# Patient Record
Sex: MALE | Race: BLACK OR AFRICAN AMERICAN | Employment: FULL TIME | URBAN - METROPOLITAN AREA
[De-identification: names, ages, dates, MRNs, and addresses within clinical notes are randomized per-mention and may not be internally consistent; named-entity substitution may affect disease eponyms.]

---

## 2017-04-11 ENCOUNTER — HOSPITAL ENCOUNTER (EMERGENCY)
Facility: HOSPITAL | Age: 27
Discharge: HOME | End: 2017-04-11
Attending: EMERGENCY MEDICINE
Payer: COMMERCIAL

## 2017-04-11 VITALS
SYSTOLIC BLOOD PRESSURE: 148 MMHG | TEMPERATURE: 98 F | WEIGHT: 235 LBS | BODY MASS INDEX: 33 KG/M2 | RESPIRATION RATE: 16 BRPM | HEART RATE: 79 BPM | DIASTOLIC BLOOD PRESSURE: 90 MMHG | OXYGEN SATURATION: 98 %

## 2017-04-11 DIAGNOSIS — J02.9 ACUTE VIRAL PHARYNGITIS: Primary | ICD-10-CM

## 2017-04-11 PROCEDURE — 99283 EMERGENCY DEPT VISIT LOW MDM: CPT

## 2017-04-11 PROCEDURE — 87081 CULTURE SCREEN ONLY: CPT | Performed by: EMERGENCY MEDICINE

## 2017-04-11 PROCEDURE — 87430 STREP A AG IA: CPT | Performed by: EMERGENCY MEDICINE

## 2017-04-11 RX ORDER — IBUPROFEN 600 MG/1
600 TABLET ORAL ONCE
Status: COMPLETED | OUTPATIENT
Start: 2017-04-11 | End: 2017-04-11

## 2017-04-11 NOTE — ED PROVIDER NOTES
Patient Seen in: [facility] Emergency Department    History   Patient presents with:  Sore Throat    Stated Complaint: sore throat    HPI    This is a 51-year-old male who arrives here with complaints of sore throat that started yesterday.   He state Pulse 92  Temp(Src) 98.3 °F (36.8 °C) (Temporal)  Resp 16  Wt 106.595 kg  SpO2 99%        Physical Exam    General: The patient has some mild redness to both tonsils. There is no exudate, peritonsillar abscess.   He does have some cysts nonspecific lymph

## 2017-04-12 ENCOUNTER — TELEPHONE (OUTPATIENT)
Dept: FAMILY MEDICINE CLINIC | Facility: CLINIC | Age: 27
End: 2017-04-12

## 2017-04-17 ENCOUNTER — PATIENT MESSAGE (OUTPATIENT)
Dept: FAMILY MEDICINE CLINIC | Facility: CLINIC | Age: 27
End: 2017-04-17

## 2017-04-18 NOTE — TELEPHONE ENCOUNTER
From: Topher Wilson  To: Sharon Bergeron MD  Sent: 4/17/2017 4:12 PM CDT  Subject: Other    Hello Doctor Virginia,    I know that I visited the [facility] in [facility] to test myself for strep throat but I was wondering if you or the Doctor that examined me

## 2017-04-19 NOTE — TELEPHONE ENCOUNTER
Pt can ask ER for note or make appt to get seen in the clinic today or tomorrow. He hasn't been seen in the clinic since 2015.    thanks

## 2017-04-19 NOTE — TELEPHONE ENCOUNTER
Patient seen in ER on 4/11/17 for Acute Viral Pharyngitis. Would you be okay with providing note for patient to be off 4/11, 4/12, 4/13 and 4/15. Patient has not been seen in our office since 2015. Patient to be notified OV due. Thank you.

## 2017-06-10 ENCOUNTER — PATIENT MESSAGE (OUTPATIENT)
Dept: FAMILY MEDICINE CLINIC | Facility: CLINIC | Age: 27
End: 2017-06-10

## 2017-06-12 NOTE — TELEPHONE ENCOUNTER
From: Tequila Colindres  To: Juve Sorenson MD  Sent: 6/10/2017 4:12 PM CDT  Subject: Non-Urgent Medical Question    Good Morning Dr. Dino Patel.    I recently took a medical examination with the [facility] Department and the results came back as 6.8 negative fo

## 2018-02-20 ENCOUNTER — LAB ENCOUNTER (OUTPATIENT)
Dept: LAB | Age: 28
End: 2018-02-20
Attending: PHYSICIAN ASSISTANT
Payer: COMMERCIAL

## 2018-02-20 ENCOUNTER — OFFICE VISIT (OUTPATIENT)
Dept: FAMILY MEDICINE CLINIC | Facility: CLINIC | Age: 28
End: 2018-02-20

## 2018-02-20 VITALS
TEMPERATURE: 99 F | BODY MASS INDEX: 33.6 KG/M2 | WEIGHT: 240 LBS | HEIGHT: 71 IN | HEART RATE: 80 BPM | SYSTOLIC BLOOD PRESSURE: 122 MMHG | RESPIRATION RATE: 16 BRPM | DIASTOLIC BLOOD PRESSURE: 78 MMHG

## 2018-02-20 DIAGNOSIS — Z86.19 H/O COLD SORES: ICD-10-CM

## 2018-02-20 DIAGNOSIS — Z86.19 H/O COLD SORES: Primary | ICD-10-CM

## 2018-02-20 PROCEDURE — 86694 HERPES SIMPLEX NES ANTBDY: CPT | Performed by: PHYSICIAN ASSISTANT

## 2018-02-20 PROCEDURE — 36415 COLL VENOUS BLD VENIPUNCTURE: CPT | Performed by: PHYSICIAN ASSISTANT

## 2018-02-20 PROCEDURE — 99213 OFFICE O/P EST LOW 20 MIN: CPT | Performed by: PHYSICIAN ASSISTANT

## 2018-02-20 RX ORDER — VALACYCLOVIR HYDROCHLORIDE 1 G/1
2 TABLET, FILM COATED ORAL EVERY 12 HOURS SCHEDULED
Qty: 4 TABLET | Refills: 5 | Status: SHIPPED | OUTPATIENT
Start: 2018-02-20 | End: 2018-05-21

## 2018-02-20 NOTE — PROGRESS NOTES
[facility] Internal Medicine Progress Note    CC:  Patient presents with:  Complete Form: Pts mother is an in-home  and has form to be completed.   Mouth Cold Sores (respiratory/integumentary): pt states he had active coldsore and kissed gi Cuff Size: adult)   Pulse 80   Temp 99 °F (37.2 °C) (Oral)   Resp 16   Ht 71\"   Wt 240 lb   BMI 33.47 kg/m²  Body mass index is 33.47 kg/m².   Physical Exam   Constitutional: He is oriented to person, place, and time and well-developed, well-nourished, and education done. Outcome: Patient verbalizes understanding.     Problem List:  Patient Active Problem List:     Herpes simplex without mention of complication     Routine general medical examination at a health care facility     Goiter

## 2018-02-20 NOTE — PATIENT INSTRUCTIONS
Thank you for choosing Lindsey Khalil PA-C at [place] [address]  To Do: Raven Kelley  1. Pt to get lab work done  2. Pt to begin medication as directed as soon as symptoms start, for 1 day only  3.  Follow-up 1 year for annual well visit    • Please informing you that the insurance company approved your testing, please call Central Scheduling at [phone]  Please allow our office 5 business days to contact you regarding any testing results.     Refill policies:   Allow 3 business days for refills; c

## 2018-02-22 LAB
HSV TYPE 1/2 COMBINED AB, IGG: >22.4 IV
HSV TYPE 1/2 COMBINED ABS, IGM: 0.39 IV

## 2018-05-09 ENCOUNTER — OFFICE VISIT (OUTPATIENT)
Dept: FAMILY MEDICINE CLINIC | Facility: CLINIC | Age: 28
End: 2018-05-09

## 2018-05-09 VITALS
WEIGHT: 242 LBS | SYSTOLIC BLOOD PRESSURE: 120 MMHG | RESPIRATION RATE: 16 BRPM | BODY MASS INDEX: 33.88 KG/M2 | DIASTOLIC BLOOD PRESSURE: 80 MMHG | HEART RATE: 66 BPM | TEMPERATURE: 98 F | HEIGHT: 71 IN

## 2018-05-09 DIAGNOSIS — L03.012 PARONYCHIA OF FINGER, LEFT: Primary | ICD-10-CM

## 2018-05-09 PROCEDURE — 99213 OFFICE O/P EST LOW 20 MIN: CPT | Performed by: FAMILY MEDICINE

## 2018-05-09 RX ORDER — CEPHALEXIN 500 MG/1
500 CAPSULE ORAL 4 TIMES DAILY
Qty: 40 CAPSULE | Refills: 0 | Status: SHIPPED | OUTPATIENT
Start: 2018-05-09 | End: 2018-05-19

## 2018-05-09 NOTE — PROGRESS NOTES
HPI:    Patient ID: Carlito Day. is a 24year old male. HPI  Mr. Emiliano Sanchez is a pleasant 25 y/o M who has been generally healthy here today for pain and swelling of his left index finger which she noted 4 days ago.   He did not notice any redness but f cervical adenopathy. Neurological: He is alert. Skin:   Examination of the skin overlying the left second finger reveals swelling and warmth and erythema on the medial aspect. There was good range of motion of that hand and finger.   Normal strength

## 2018-05-09 NOTE — PATIENT INSTRUCTIONS
Thank you for choosing Keyla Chance MD at [place] [address]  To Do: Jordana Henry.  1. Please see info RE; [facility] below  [employer] is located in [address]. Monday, Tuesday & Friday – 8 a.m. to 4 p.m.   Wednesday, Thursday – 7 a.m. t • Please call our office about any questions regarding your treatment/medicines/tests as a result of today's visit.  For your safety, read the entire package insert of all medicines prescribed to you and be aware of all of the risks of treatment even beyon The infection will need to be drained if pus is present. If the infection has been caught early, you may need only antibiotic treatment. Healing will take about 1 to 2 weeks.   Home care  Follow these guidelines when caring for yourself at home:  · Clean an © 3405-4922 [employer] [address]. [address] [facility], [facility] [place]. All rights reserved. This information is not intended as a substitute for professional medical care. Always follow your healthcare professional's instructions.

## 2018-05-21 RX ORDER — VALACYCLOVIR HYDROCHLORIDE 1 G/1
2 TABLET, FILM COATED ORAL EVERY 12 HOURS SCHEDULED
Qty: 4 TABLET | Refills: 5 | Status: SHIPPED | OUTPATIENT
Start: 2018-05-21 | End: 2019-09-12

## 2018-05-21 NOTE — TELEPHONE ENCOUNTER
Requesting Valtrex  LOV: 5/9/18  RTC: prn  Last Relevant Labs: n/a  Filled: 2/20/18 #4 with 5 refills    No future appointments.       Herpes Agent Protocol Passed5/21 1:55 PM   Appointment in the past 12 or next 3 months     PP - refilled

## 2018-10-24 ENCOUNTER — PATIENT MESSAGE (OUTPATIENT)
Dept: FAMILY MEDICINE CLINIC | Facility: CLINIC | Age: 28
End: 2018-10-24

## 2018-10-24 NOTE — TELEPHONE ENCOUNTER
From: Wisam Marcum. To: Felipe Herrera  Sent: 10/24/2018 12:04 PM CDT  Subject: Non-Urgent Medical Question    Hi Ms Antonio Alexandre,    I was wondering if I could get a copy of my blood pressure history? I need to give it to my employer.  They ha

## 2018-10-24 NOTE — PROGRESS NOTES
Patient requesting print out of B/P readings. Okay to provide?   Can provide patient with Vital signs flowsheet

## 2019-01-09 ENCOUNTER — OFFICE VISIT (OUTPATIENT)
Dept: FAMILY MEDICINE CLINIC | Facility: CLINIC | Age: 29
End: 2019-01-09
Payer: COMMERCIAL

## 2019-01-09 VITALS
RESPIRATION RATE: 16 BRPM | HEART RATE: 74 BPM | HEIGHT: 71 IN | TEMPERATURE: 99 F | DIASTOLIC BLOOD PRESSURE: 68 MMHG | WEIGHT: 255 LBS | SYSTOLIC BLOOD PRESSURE: 112 MMHG | BODY MASS INDEX: 35.7 KG/M2

## 2019-01-09 DIAGNOSIS — S91.209D AVULSION OF TOENAIL, SUBSEQUENT ENCOUNTER: Primary | ICD-10-CM

## 2019-01-09 PROCEDURE — 99213 OFFICE O/P EST LOW 20 MIN: CPT | Performed by: FAMILY MEDICINE

## 2019-01-09 NOTE — PROGRESS NOTES
HPI:    Patient ID: Julián Knowles. is a 34year old male. HPI  Mr. Job Mena is a pleasant 53-year-old gentleman who was recently seen at [facility] emergency room after his right foot was caught in the door.   He had noted his right great toena

## 2019-01-09 NOTE — PATIENT INSTRUCTIONS
Thank you for choosing Julieth Joyce MD at [place] [address]  To Do: Rubina Greer.  1.  Please wash wound with soap and water and watch for signs of bacterial infection such as redness, pain, swelling, discharge.   [facility] is located i and stop treatment if problems arise, but know that our intention is that the benefits outweigh those potential risks and we strive to make you healthier and to improve your quality of life.     Referrals, and Radiology Information:    If your insurance req

## 2019-02-07 ENCOUNTER — PATIENT MESSAGE (OUTPATIENT)
Dept: FAMILY MEDICINE CLINIC | Facility: CLINIC | Age: 29
End: 2019-02-07

## 2019-02-07 DIAGNOSIS — N46.9 INFERTILITY MALE: Primary | ICD-10-CM

## 2019-02-07 DIAGNOSIS — Z31.41 FERTILITY TESTING: ICD-10-CM

## 2019-02-07 NOTE — TELEPHONE ENCOUNTER
From: Mino Terry. To: Dennie Pore, MD  Sent: 2/7/2019 1:55 AM CST  Subject: Non-Urgent Jewelene Peeling Dr Jaime Palomo,    I wanted to inquire about finding out about potential testing for my fertility rates?  I am not trying to have childr

## 2019-02-07 NOTE — PROGRESS NOTES
Would you like to refer pt to Reproductive Endocrinology & Infertility    Nestor Cabezas MD  [place] [id number] [facility]  [address] [place] [address], [facility] [address]  Phone: [phone]  Fax: [phone]

## 2019-09-12 RX ORDER — VALACYCLOVIR HYDROCHLORIDE 1 G/1
2 TABLET, FILM COATED ORAL EVERY 12 HOURS SCHEDULED
Qty: 4 TABLET | Refills: 5 | Status: SHIPPED | OUTPATIENT
Start: 2019-09-12 | End: 2019-09-24

## 2019-09-24 RX ORDER — VALACYCLOVIR HYDROCHLORIDE 1 G/1
2 TABLET, FILM COATED ORAL EVERY 12 HOURS SCHEDULED
Qty: 4 TABLET | Refills: 5 | Status: SHIPPED | OUTPATIENT
Start: 2019-09-24 | End: 2019-12-04

## 2019-09-24 NOTE — TELEPHONE ENCOUNTER
Patient is asking if he can get more than 4 tabs at a time. Please approve appropriate amount.  Thank you

## 2019-09-26 ENCOUNTER — HOSPITAL ENCOUNTER (OUTPATIENT)
Dept: GENERAL RADIOLOGY | Age: 29
Discharge: HOME | End: 2019-09-26
Attending: FAMILY MEDICINE
Payer: COMMERCIAL

## 2019-09-26 ENCOUNTER — OFFICE VISIT (OUTPATIENT)
Dept: FAMILY MEDICINE CLINIC | Facility: CLINIC | Age: 29
End: 2019-09-26
Payer: COMMERCIAL

## 2019-09-26 ENCOUNTER — IMMUNIZATION (OUTPATIENT)
Dept: FAMILY MEDICINE CLINIC | Facility: CLINIC | Age: 29
End: 2019-09-26
Payer: COMMERCIAL

## 2019-09-26 VITALS
SYSTOLIC BLOOD PRESSURE: 118 MMHG | OXYGEN SATURATION: 99 % | DIASTOLIC BLOOD PRESSURE: 88 MMHG | RESPIRATION RATE: 16 BRPM | HEIGHT: 71 IN | HEART RATE: 85 BPM | TEMPERATURE: 98 F | BODY MASS INDEX: 36.4 KG/M2 | WEIGHT: 260 LBS

## 2019-09-26 DIAGNOSIS — E66.9 CLASS 2 OBESITY WITHOUT SERIOUS COMORBIDITY WITH BODY MASS INDEX (BMI) OF 35.0 TO 35.9 IN ADULT, UNSPECIFIED OBESITY TYPE: ICD-10-CM

## 2019-09-26 DIAGNOSIS — Z23 NEED FOR VACCINATION: ICD-10-CM

## 2019-09-26 DIAGNOSIS — Z13.0 SCREENING FOR ENDOCRINE, METABOLIC AND IMMUNITY DISORDER: ICD-10-CM

## 2019-09-26 DIAGNOSIS — M25.562 CHRONIC PAIN OF LEFT KNEE: ICD-10-CM

## 2019-09-26 DIAGNOSIS — Z13.228 SCREENING FOR ENDOCRINE, METABOLIC AND IMMUNITY DISORDER: ICD-10-CM

## 2019-09-26 DIAGNOSIS — G89.29 CHRONIC PAIN OF LEFT KNEE: ICD-10-CM

## 2019-09-26 DIAGNOSIS — Z13.29 SCREENING FOR ENDOCRINE, METABOLIC AND IMMUNITY DISORDER: ICD-10-CM

## 2019-09-26 DIAGNOSIS — Z00.00 WELLNESS EXAMINATION: Primary | ICD-10-CM

## 2019-09-26 PROBLEM — E66.812 CLASS 2 OBESITY WITHOUT SERIOUS COMORBIDITY WITH BODY MASS INDEX (BMI) OF 35.0 TO 35.9 IN ADULT: Status: ACTIVE | Noted: 2019-09-26

## 2019-09-26 PROCEDURE — 99395 PREV VISIT EST AGE 18-39: CPT | Performed by: FAMILY MEDICINE

## 2019-09-26 PROCEDURE — 90471 IMMUNIZATION ADMIN: CPT | Performed by: FAMILY MEDICINE

## 2019-09-26 PROCEDURE — 90686 IIV4 VACC NO PRSV 0.5 ML IM: CPT | Performed by: FAMILY MEDICINE

## 2019-09-26 PROCEDURE — 99213 OFFICE O/P EST LOW 20 MIN: CPT | Performed by: FAMILY MEDICINE

## 2019-09-26 PROCEDURE — 73560 X-RAY EXAM OF KNEE 1 OR 2: CPT | Performed by: FAMILY MEDICINE

## 2019-09-26 NOTE — PROGRESS NOTES
Wellness Exam    REASON FOR VISIT:    Rowan Boas. is a 34year old male who presents for an [address]     Current Complaints: Mr. Reanna Díaz is a pleasant 33 y/o M here for his wellness exam  Flu Shot: see immunization record  Health Maint weeks)?: Not at all    PHQ-2 SCORE: 0              PREVENTATIVE SERVICES  INDICATIONS AND SCHEDULE Recommendation Internal Lab or Procedure External Lab or Procedure   Colonoscopy Screen Every 10 years There are no preventive care reminders to display for Hypertension Father    • Other (gastric ulcer [Other]) Mother    • Diabetes Maternal Grandfather         type 2      SOCIAL HISTORY:   Social History    Tobacco Use      Smoking status: Former Smoker      Smokeless tobacco: Never Used    Alcohol use:  No Examination of the left knee reveals no erythema, no warmth, no swelling or tenderness. Negative anterior and posterior drawer sign and Lachman sign. There was good range of motion and normal strength and intact sensation.   Lymphadenopathy:     He has Metabolic Panel (14) [E]      Lipid Panel [E]      Testosterone, Total Free, Male [E]      TSH and Free T4 [E]      Vitamin D, 25-Hydroxy [E]      Vitamin B12 [E]      Imaging & Consults:  OP REFERRAL TO NUTRITIONIST/DIETICIAN    His 5 year prevention plan

## 2019-09-26 NOTE — PATIENT INSTRUCTIONS
Thank you for choosing Cristian Lopez MD at [address]  To Do: Mino Terry.  1. Please see age appropriate health prevention below    [employer] is located in [address]. Monday, Tuesday & Friday – 8 a.m. to 4 p.m.   Wednesday, [place] that the benefits outweigh those potential risks and we strive to make you healthier and to improve your quality of life.     Referrals, and Radiology Information:    If your insurance requires a referral to a specialist, please allow 5 business days to pro exams   Blood pressure All men in this age group Yearly checkup if your blood pressure is normal  Normal blood pressure is less than 120/80  If your blood pressure is higher than normal, follow the advice of your healthcare provider.     All men in this ag up to age 32 3 doses; the second dose should be given 1 to 2 months after the first dose and the third dose given 6 months after the first dose   Influenza (flu) All men in this age group Once a year   Measles, mumps, rubella (MMR) All men in this age [de-identified] symptoms. The goal is to find a disease early so lifestyle changes can be made and you can be watched more closely to reduce the risk of disease, or to detect it early enough to treat it most effectively.  Screening tests are not considered diagnostic, but at increased risk for infection – talk with your healthcare provider 2 doses given at least 6 months apart   Hepatitis B Men at increased risk for infection – talk with your healthcare provider 3 doses over 6 months; second dose should be given 1 month aft immunizations, should receive all appropriate catch-up vaccines recommended by the CDC. Date Last Reviewed: 2/1/2017  © 6451-4332 [employer] [address]. [address] [facility], [address] [place] [address]. All rights reserved.  This information is not intended a

## 2019-09-26 NOTE — PROGRESS NOTES
Imaging report reviewed and shows no concerning findings.  Please notify patient.    -Dr. Cami Glaser

## 2019-12-04 RX ORDER — VALACYCLOVIR HYDROCHLORIDE 1 G/1
2 TABLET, FILM COATED ORAL EVERY 12 HOURS SCHEDULED
Qty: 4 TABLET | Refills: 5 | Status: SHIPPED | OUTPATIENT
Start: 2019-12-04 | End: 2020-01-04

## 2019-12-04 NOTE — TELEPHONE ENCOUNTER
Herpes Agent Protocol [id number]/4 8:33 AM   Appointment in the past 12 or next 3 months     Requesting valACYclovir HCl 1 G  LOV: 9/26/19  RTC: 1 year  Last Relevant Labs: lab orders pended  Filled: 9/24/19  # 4 with 5 refills    No future appointments.

## 2020-01-06 RX ORDER — VALACYCLOVIR HYDROCHLORIDE 1 G/1
2 TABLET, FILM COATED ORAL EVERY 12 HOURS SCHEDULED
Qty: 4 TABLET | Refills: 5 | Status: SHIPPED | OUTPATIENT
Start: 2020-01-06 | End: 2020-01-27

## 2020-01-06 NOTE — TELEPHONE ENCOUNTER
Herpes Agent Protocol Passed1/4 5:57 PM   Appointment in the past 12 or next 3 months     Requesting valACYclovir HCl 1 G  LOV: 9/26/19   RTC: 1 year  Last Relevant Labs: lab orders pending   Filled: 12/4/19  #n4 with 5 refills    No future appointments.

## 2020-01-27 RX ORDER — VALACYCLOVIR HYDROCHLORIDE 1 G/1
2 TABLET, FILM COATED ORAL EVERY 12 HOURS SCHEDULED
Qty: 4 TABLET | Refills: 5 | Status: SHIPPED | OUTPATIENT
Start: 2020-01-27 | End: 2020-09-18

## 2020-01-27 RX ORDER — VALACYCLOVIR HYDROCHLORIDE 1 G/1
2 TABLET, FILM COATED ORAL EVERY 12 HOURS SCHEDULED
Qty: 4 TABLET | Refills: 5 | Status: SHIPPED | OUTPATIENT
Start: 2020-01-27 | End: 2020-03-06

## 2020-01-27 NOTE — TELEPHONE ENCOUNTER
Herpes Agent Protocol Passed1/26 10:11 AM   Appointment in the past 12 or next 3 months           Requesting  valACYclovir HCl 1 G  LOV: 9/26/19  RTC: 1 year  Last Relevant Labs: lab orders pending   Filled: 1/6/20 #4 with 5 refills    No future appointmen

## 2020-03-06 RX ORDER — VALACYCLOVIR HYDROCHLORIDE 1 G/1
2 TABLET, FILM COATED ORAL EVERY 12 HOURS SCHEDULED
Qty: 4 TABLET | Refills: 5 | Status: CANCELLED | OUTPATIENT
Start: 2020-03-06

## 2020-03-07 RX ORDER — VALACYCLOVIR HYDROCHLORIDE 1 G/1
2 TABLET, FILM COATED ORAL EVERY 12 HOURS SCHEDULED
Qty: 4 TABLET | Refills: 5 | Status: SHIPPED | OUTPATIENT
Start: 2020-03-07 | End: 2020-03-29

## 2020-03-07 NOTE — TELEPHONE ENCOUNTER
Herpes Agent Protocol Passed3/6 11:18 AM   Appointment in the past 12 or next 3 months         Requesting valACYclovir HCl 1 G Oral Tab  LOV: 9/26/19  RTC:   Last Relevant Labs: lab orders pending   Filled: 1/27/20 # 4 with 5 refills    No future appoi

## 2020-03-30 RX ORDER — VALACYCLOVIR HYDROCHLORIDE 1 G/1
2 TABLET, FILM COATED ORAL EVERY 12 HOURS SCHEDULED
Qty: 4 TABLET | Refills: 5 | Status: SHIPPED | OUTPATIENT
Start: 2020-03-30 | End: 2020-09-16

## 2020-03-30 NOTE — TELEPHONE ENCOUNTER
valACYclovir HCl 1 G Oral Tab               Sig: Take 2 tablets (2,000 mg total) by mouth every 12 (twelve) hours.  As soon as symptoms start for 1 day    Disp:  4 tablet    Refills:  5    Start: 3/29/2020    Class: Normal    Non-formulary    Last ordered:

## 2020-04-07 RX ORDER — VALACYCLOVIR HYDROCHLORIDE 1 G/1
2 TABLET, FILM COATED ORAL EVERY 12 HOURS SCHEDULED
Qty: 4 TABLET | Refills: 5 | OUTPATIENT
Start: 2020-04-07

## 2020-04-09 RX ORDER — VALACYCLOVIR HYDROCHLORIDE 1 G/1
2 TABLET, FILM COATED ORAL EVERY 12 HOURS SCHEDULED
Qty: 4 TABLET | Refills: 5 | Status: CANCELLED | OUTPATIENT
Start: 2020-04-09

## 2020-04-10 RX ORDER — VALACYCLOVIR HYDROCHLORIDE 1 G/1
2 TABLET, FILM COATED ORAL EVERY 12 HOURS SCHEDULED
Qty: 4 TABLET | Refills: 5 | Status: CANCELLED | OUTPATIENT
Start: 2020-04-10

## 2020-04-10 NOTE — TELEPHONE ENCOUNTER
valACYclovir HCl 1 G Oral Tab              Sig: Take 2 tablets (2,000 mg total) by mouth every 12 (twelve) hours.  As soon as symptoms start for 1 day    Disp:  4 tablet    Refills:  5    Start: 4/10/2020    Class: Normal    Non-formulary    Last order

## 2020-04-10 NOTE — TELEPHONE ENCOUNTER
Requested Prescriptions     Pending Prescriptions Disp Refills   • valACYclovir HCl 1 G Oral Tab 4 tablet 5     Sig: Take 2 tablets (2,000 mg total) by mouth every 12 (twelve) hours.  As soon as symptoms start for 1 day         THIS IS A DUPLICATE REQUEST

## 2020-09-16 NOTE — TELEPHONE ENCOUNTER
No future appointments. Patient hasnt been seen since 9/26/2019. Please call pt to schedule a physical with  after 9/26. Once scheduled please route back to clinical pool so we can refill medication appropriately, thanks!

## 2020-09-18 RX ORDER — VALACYCLOVIR HYDROCHLORIDE 1 G/1
2 TABLET, FILM COATED ORAL EVERY 12 HOURS SCHEDULED
Qty: 20 TABLET | Refills: 1 | Status: SHIPPED | OUTPATIENT
Start: 2020-09-18 | End: 2021-02-14

## 2020-09-18 NOTE — TELEPHONE ENCOUNTER
Requested Prescriptions     Pending Prescriptions Disp Refills   • valACYclovir HCl 1 G Oral Tab 4 tablet 5     Sig: Take 2 tablets (2,000 mg total) by mouth every 12 (twelve) hours.  As soon as symptoms start for 1 day       LOV: 9/26/2019  RTC: 1 year

## 2020-09-25 ENCOUNTER — TELEPHONE (OUTPATIENT)
Dept: FAMILY MEDICINE CLINIC | Facility: CLINIC | Age: 30
End: 2020-09-25

## 2020-09-25 NOTE — TELEPHONE ENCOUNTER
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 Please advise.

## 2020-09-25 NOTE — TELEPHONE ENCOUNTER
Informed pt that we do not have rapid Covid testing, pt states he has already made an appointment at [facility] for testing. Encouraged pt to call back and schedule a VV if he gets symptoms to discuss symptom management, verbalizes understanding.

## 2020-09-29 ENCOUNTER — OFFICE VISIT (OUTPATIENT)
Dept: FAMILY MEDICINE CLINIC | Facility: CLINIC | Age: 30
End: 2020-09-29
Payer: COMMERCIAL

## 2020-09-29 VITALS
WEIGHT: 246 LBS | BODY MASS INDEX: 34.44 KG/M2 | SYSTOLIC BLOOD PRESSURE: 128 MMHG | RESPIRATION RATE: 16 BRPM | DIASTOLIC BLOOD PRESSURE: 88 MMHG | TEMPERATURE: 98 F | HEART RATE: 68 BPM | OXYGEN SATURATION: 97 % | HEIGHT: 71 IN

## 2020-09-29 DIAGNOSIS — Z00.00 LABORATORY EXAM ORDERED AS PART OF ROUTINE GENERAL MEDICAL EXAMINATION: ICD-10-CM

## 2020-09-29 DIAGNOSIS — Z23 NEED FOR VACCINATION: ICD-10-CM

## 2020-09-29 DIAGNOSIS — G44.209 TENSION HEADACHE: ICD-10-CM

## 2020-09-29 DIAGNOSIS — Z00.00 WELLNESS EXAMINATION: Primary | ICD-10-CM

## 2020-09-29 DIAGNOSIS — J30.9 ALLERGIC RHINITIS, UNSPECIFIED SEASONALITY, UNSPECIFIED TRIGGER: ICD-10-CM

## 2020-09-29 PROCEDURE — 3074F SYST BP LT 130 MM HG: CPT | Performed by: FAMILY MEDICINE

## 2020-09-29 PROCEDURE — 3079F DIAST BP 80-89 MM HG: CPT | Performed by: FAMILY MEDICINE

## 2020-09-29 PROCEDURE — 99214 OFFICE O/P EST MOD 30 MIN: CPT | Performed by: FAMILY MEDICINE

## 2020-09-29 PROCEDURE — 90686 IIV4 VACC NO PRSV 0.5 ML IM: CPT | Performed by: FAMILY MEDICINE

## 2020-09-29 PROCEDURE — 90471 IMMUNIZATION ADMIN: CPT | Performed by: FAMILY MEDICINE

## 2020-09-29 PROCEDURE — 99395 PREV VISIT EST AGE 18-39: CPT | Performed by: FAMILY MEDICINE

## 2020-09-29 PROCEDURE — 3008F BODY MASS INDEX DOCD: CPT | Performed by: FAMILY MEDICINE

## 2020-09-29 RX ORDER — MULTIVITAMIN
1 TABLET ORAL DAILY
COMMUNITY

## 2020-09-29 RX ORDER — B-COMPLEX WITH VITAMIN C
1 TABLET ORAL DAILY
COMMUNITY

## 2020-09-29 RX ORDER — MULTIVIT WITH MINERALS/LUTEIN
250 TABLET ORAL DAILY
COMMUNITY

## 2020-09-29 RX ORDER — BIOTIN 1 MG
1 TABLET ORAL DAILY
COMMUNITY

## 2020-09-29 NOTE — PROGRESS NOTES
Wellness Exam    REASON FOR VISIT:    Eve Collins. is a 34year old male who presents for an [address]     Current Complaints: MrFanny Mann Felecia is here for his wellness exam  Flu Shot: see immunization record  Health Maintenance Topics with d depressed, or hopeless (over the last two weeks)?: Not at all    PHQ-2 SCORE: 0              PREVENTATIVE SERVICES  INDICATIONS AND SCHEDULE Recommendation Internal Lab or Procedure External Lab or Procedure   Colonoscopy Screen Every 10 years There are no start for 1 day 20 tablet 1      MEDICAL INFORMATION:   Past Medical History:   Diagnosis Date   • Goiter 7/25/2013   • Undiagnosed cardiac murmurs       No past surgical history on file.    Family History   Problem Relation Age of Onset   • Anemia Mother light. No scleral icterus. Neck: Normal range of motion. No thyromegaly present. Cardiovascular: Normal rate, regular rhythm and normal heart sounds. Exam reveals no friction rub. No murmur heard.   Pulmonary/Chest: Effort normal and breath sounds n symptoms worsen or persist.  Tension headache  -Explained to him that this is likely muscular tension headache; continue ibuprofen as needed and will monitor. We will check routine labs and notify once we get test results.   Continue with current lifesty VISIT,EST,18-39  HPI:    Patient ID: Ramon Simmonsr. is a 34year old male.     HPI    Review of Systems         Current Outpatient Medications   Medication Sig Dispense Refill   • Multiple Vitamin (MULTI-VITAMIN DAILY) Oral Tab Take 1 tablet by mouth christo

## 2020-09-29 NOTE — PATIENT INSTRUCTIONS
Prevention Guidelines, Men Ages 25 to 44  Screening tests and vaccines are an important part of managing your health. A screening test is done to find possible disorders or diseases in people who don't have any symptoms.  The goal is to find a disease ear Chickenpox (varicella) All men in this age group who have no record of this infection or vaccine 2 doses; the second dose should be given at least 4 weeks after the first dose   Hepatitis A Men at increased risk for infection – talk with your healthcare pr Sexually transmitted infection prevention Men who are sexually active At routine exams   Skin cancer All men in this age group. At routine exams. You may be reminded to avoid intentional tanning and tanning beds.     1Those who are 25years of age, who are · If you have a pet, keep it out of your bedroom and off upholstered furniture. Pollen:  · When pollen counts are high, keep windows of your car and home closed. If possible, use an air conditioner instead.   · Wear a filter mask when mowing or doing yard © 2458-1889 [employer] [address]. [address] [facility], [facility] [place]. All rights reserved. This information is not intended as a substitute for professional medical care. Always follow your healthcare professional's instructions.         Tension Follow up with your healthcare provider, or as advised. Talk with your provider if you have frequent headaches. He or she can figure out a treatment plan. Ask if you can have medicine to take at home the next time you get a bad headache.  This may keep you

## 2020-09-30 ENCOUNTER — TELEPHONE (OUTPATIENT)
Dept: FAMILY MEDICINE CLINIC | Facility: CLINIC | Age: 30
End: 2020-09-30

## 2020-09-30 ENCOUNTER — LAB ENCOUNTER (OUTPATIENT)
Dept: LAB | Age: 30
End: 2020-09-30
Attending: FAMILY MEDICINE
Payer: COMMERCIAL

## 2020-09-30 DIAGNOSIS — Z00.00 LABORATORY EXAM ORDERED AS PART OF ROUTINE GENERAL MEDICAL EXAMINATION: ICD-10-CM

## 2020-09-30 PROCEDURE — 80061 LIPID PANEL: CPT

## 2020-09-30 PROCEDURE — 36415 COLL VENOUS BLD VENIPUNCTURE: CPT

## 2020-09-30 PROCEDURE — 80053 COMPREHEN METABOLIC PANEL: CPT

## 2020-09-30 PROCEDURE — 86900 BLOOD TYPING SEROLOGIC ABO: CPT

## 2020-09-30 PROCEDURE — 84402 ASSAY OF FREE TESTOSTERONE: CPT

## 2020-09-30 PROCEDURE — 84403 ASSAY OF TOTAL TESTOSTERONE: CPT

## 2020-09-30 PROCEDURE — 85025 COMPLETE CBC W/AUTO DIFF WBC: CPT

## 2020-09-30 PROCEDURE — 86901 BLOOD TYPING SEROLOGIC RH(D): CPT

## 2020-09-30 PROCEDURE — 84443 ASSAY THYROID STIM HORMONE: CPT

## 2020-09-30 NOTE — TELEPHONE ENCOUNTER
Pt had CPE scheduled yesterday with Dr. Rosi Herrera. He thought that the fertility testing was also ordered at that appt. He had the other labs done today. Please place orders for fertility testing.

## 2020-09-30 NOTE — TELEPHONE ENCOUNTER
Pt has questions regarding fertility testing. He is considering this as an option and would like to speak to clinical staff.

## 2020-09-30 NOTE — TELEPHONE ENCOUNTER
[facility]

## 2020-10-04 ENCOUNTER — PATIENT MESSAGE (OUTPATIENT)
Dept: FAMILY MEDICINE CLINIC | Facility: CLINIC | Age: 30
End: 2020-10-04

## 2020-10-05 NOTE — TELEPHONE ENCOUNTER
From: Opal Small. To: Leticia Narayan MD  Sent: 10/4/2020 2:42 PM CDT  Subject: Test Results Question    I have a question about TSH W REFLEX TO FREE T4 resulted on 9/30/20 at 9:05 PM.    What does this mean? Can you explain the results?

## 2020-10-05 NOTE — TELEPHONE ENCOUNTER
His blood type is B+. Creatinine was mildly elevated but keep hydrated. We are still awaiting on his testosterone levels. Other labs unremarkable.

## 2020-10-05 NOTE — TELEPHONE ENCOUNTER
From: Kwasi Seen. To: Pooja Bedoya MD  Sent: 10/4/2020 4:19 PM CDT  Subject: Visit Follow-up Question    Hello Doctor,    Since our visit my headaches have continued.  On 10/02 I developed a fever that reached 102 and I began experiencing motion s

## 2020-10-06 DIAGNOSIS — R79.89 LOW TESTOSTERONE: Primary | ICD-10-CM

## 2020-12-03 ENCOUNTER — PATIENT MESSAGE (OUTPATIENT)
Dept: FAMILY MEDICINE CLINIC | Facility: CLINIC | Age: 30
End: 2020-12-03

## 2020-12-03 NOTE — TELEPHONE ENCOUNTER
From: Ashly Greer. To: Norma Espinoza MD  Sent: 12/3/2020 5:51 AM CST  Subject: Non-Urgent Baldemar Bethea Dr,    I was wondering if your office had a dermatologist recommendation I could use?  I have been having some minor skin problems an

## 2021-02-15 RX ORDER — VALACYCLOVIR HYDROCHLORIDE 1 G/1
2 TABLET, FILM COATED ORAL EVERY 12 HOURS SCHEDULED
Qty: 20 TABLET | Refills: 1 | Status: SHIPPED | OUTPATIENT
Start: 2021-02-15 | End: 2021-11-30

## 2021-02-15 NOTE — TELEPHONE ENCOUNTER
Herpes Agent Protocol [id number]/14/2021 12:30 PM   Appointment in the past 12 or next 3 months     Refill protocol passed because the patient met the following protocol for    Requested Prescriptions     Pending Prescriptions Disp Refills   • valACYclovir H

## 2021-03-02 ENCOUNTER — PATIENT MESSAGE (OUTPATIENT)
Dept: FAMILY MEDICINE CLINIC | Facility: CLINIC | Age: 31
End: 2021-03-02

## 2021-03-03 ENCOUNTER — HOSPITAL ENCOUNTER (OUTPATIENT)
Dept: GENERAL RADIOLOGY | Age: 31
Discharge: HOME | End: 2021-03-03
Attending: FAMILY MEDICINE
Payer: COMMERCIAL

## 2021-03-03 ENCOUNTER — OFFICE VISIT (OUTPATIENT)
Dept: FAMILY MEDICINE CLINIC | Facility: CLINIC | Age: 31
End: 2021-03-03
Payer: COMMERCIAL

## 2021-03-03 VITALS
SYSTOLIC BLOOD PRESSURE: 132 MMHG | TEMPERATURE: 99 F | OXYGEN SATURATION: 98 % | HEIGHT: 71 IN | DIASTOLIC BLOOD PRESSURE: 88 MMHG | RESPIRATION RATE: 16 BRPM | HEART RATE: 82 BPM | WEIGHT: 258 LBS | BODY MASS INDEX: 36.12 KG/M2

## 2021-03-03 DIAGNOSIS — M79.671 RIGHT FOOT PAIN: ICD-10-CM

## 2021-03-03 DIAGNOSIS — M79.671 RIGHT FOOT PAIN: Primary | ICD-10-CM

## 2021-03-03 PROCEDURE — 3079F DIAST BP 80-89 MM HG: CPT | Performed by: FAMILY MEDICINE

## 2021-03-03 PROCEDURE — 73630 X-RAY EXAM OF FOOT: CPT | Performed by: FAMILY MEDICINE

## 2021-03-03 PROCEDURE — 99213 OFFICE O/P EST LOW 20 MIN: CPT | Performed by: FAMILY MEDICINE

## 2021-03-03 PROCEDURE — 3075F SYST BP GE 130 - 139MM HG: CPT | Performed by: FAMILY MEDICINE

## 2021-03-03 PROCEDURE — 3008F BODY MASS INDEX DOCD: CPT | Performed by: FAMILY MEDICINE

## 2021-03-03 NOTE — PROGRESS NOTES
Imaging report reviewed and shows no concerning findings.  Please notify patient.    -Dr. Ashlee Nogueira

## 2021-03-03 NOTE — PROGRESS NOTES
HPI:    Patient ID: Eric Browning. is a 31year old male. HPI  Mr. Jose Peoples is a pleasant 53-year-old gentleman here today for right foot pain which started about 3 weeks ago when he was kickboxing and had hit the other person on that person's elbow. Imaging & Referrals:  None       PF#[id number]

## 2021-03-03 NOTE — PATIENT INSTRUCTIONS
Thank you for choosing Iva MD Ronald at [place] [address]  To Do: [address] Please read info below  [employer] is located in [address]. Monday, Tuesday & Friday – 8 a.m. to 4 p.m. Wednesday, Thursday – 7 a.m. to 3 p.m.   The potential risks and we strive to make you healthier and to improve your quality of life.     Referrals, and Radiology Information:    If your insurance requires a referral to a specialist, please allow 5 business days to process your referral request.    If tissue that allows joints to move easily. · The plantar fascia is a sheet of fibrous tissue that supports the arch and encloses muscles there. Melanie last reviewed this educational content on 7/1/2020  © 5135-1158 [employer] [address].  All rights

## 2021-03-12 DIAGNOSIS — Z23 NEED FOR VACCINATION: ICD-10-CM

## 2021-03-15 ENCOUNTER — PATIENT MESSAGE (OUTPATIENT)
Dept: FAMILY MEDICINE CLINIC | Facility: CLINIC | Age: 31
End: 2021-03-15

## 2021-03-15 NOTE — TELEPHONE ENCOUNTER
From: Sanna Quach. To: Priya Clark MD  Sent: 3/15/2021 10:52 AM CDT  Subject: Other    Hey Doctor,    I've already received the Covid-19 Vaccine. 2nd shot today. You can add it to my records if you like. Spoke with patient and informed him that this medication is not to be used as a scheduled drug. If it was prescribed for scheduled administration than 22 days or 3 weeks is correct for refill. But because this medication is a PRN than it should last at least 30 days. The max dose should not be taken everyday. Patient informed that his next refill is not due until 5/10/19. Patient verbalized understanding and has no further questions or concerns at this time.

## 2021-07-21 ENCOUNTER — PATIENT MESSAGE (OUTPATIENT)
Dept: FAMILY MEDICINE CLINIC | Facility: CLINIC | Age: 31
End: 2021-07-21

## 2021-07-22 RX ORDER — CEPHALEXIN 500 MG/1
500 CAPSULE ORAL 2 TIMES DAILY
Qty: 20 CAPSULE | Refills: 0 | Status: SHIPPED | OUTPATIENT
Start: 2021-07-22 | End: 2021-08-01

## 2021-07-22 NOTE — TELEPHONE ENCOUNTER
This might be a folliculitis which is bacterial infection of the hair follicles of the face in this instance. It could be from shaving that area with a dirty blade. Recommend Keflex 500 mg 1 tablet twice a day for 10 days.   If with no improvement follow-

## 2021-07-22 NOTE — TELEPHONE ENCOUNTER
From: Ashly Greer. To: Norma Espinoza MD  Sent: 7/21/2021 11:51 PM CDT  Subject: Referral Jessy Foy Dr,    Recently my skin has been breaking out.  However, unlike normal breakouts these breakouts have been accompanied with sores and clear ooz

## 2021-09-23 DIAGNOSIS — Z02.1 PRE-EMPLOYMENT HEALTH SCREENING EXAMINATION: Primary | ICD-10-CM

## 2021-09-30 ENCOUNTER — TELEPHONE (OUTPATIENT)
Dept: FAMILY MEDICINE CLINIC | Facility: CLINIC | Age: 31
End: 2021-09-30

## 2021-09-30 ENCOUNTER — PATIENT MESSAGE (OUTPATIENT)
Dept: FAMILY MEDICINE CLINIC | Facility: CLINIC | Age: 31
End: 2021-09-30

## 2021-09-30 NOTE — TELEPHONE ENCOUNTER
From: Angi Bender  To: Shaniqua Tolentino. Sent: 9/30/2021 11:20 AM CDT  Subject: TB test    Hi Favian,   We do not have TB test on chart. Dr. Rebecca Montiel ordered TB test for you. Please call office at [phone] to schedule nurse visit.    Thank you,   Berlin Boxer

## 2021-09-30 NOTE — TELEPHONE ENCOUNTER
Patient calling, asking if TB test has been done in office. Unable to view on Serious Businesst, patient states he might have had one.  Please advise

## 2021-10-02 ENCOUNTER — PATIENT MESSAGE (OUTPATIENT)
Dept: FAMILY MEDICINE CLINIC | Facility: CLINIC | Age: 31
End: 2021-10-02

## 2021-10-04 NOTE — TELEPHONE ENCOUNTER
From: Walt Pabon. To: Mary Beth Bolanos MD  Sent: 10/2/2021 12:14 PM CDT  Subject: Medical File Update    I recently had a pre employment physical and TB shot. Can we update.y file to show this?

## 2021-11-30 RX ORDER — VALACYCLOVIR HYDROCHLORIDE 1 G/1
2000 TABLET, FILM COATED ORAL EVERY 12 HOURS SCHEDULED
Qty: 20 TABLET | Refills: 1 | Status: SHIPPED | OUTPATIENT
Start: 2021-11-30

## 2022-02-22 RX ORDER — VALACYCLOVIR HYDROCHLORIDE 1 G/1
2000 TABLET, FILM COATED ORAL EVERY 12 HOURS SCHEDULED
Qty: 20 TABLET | Refills: 1 | Status: SHIPPED | OUTPATIENT
Start: 2022-02-22

## 2022-02-23 ENCOUNTER — PATIENT MESSAGE (OUTPATIENT)
Dept: FAMILY MEDICINE CLINIC | Facility: CLINIC | Age: 32
End: 2022-02-23

## 2022-02-23 NOTE — TELEPHONE ENCOUNTER
From: Cash Cisse. To: Natividad Echevarria MD  Sent: 2/23/2022 8:23 AM CST  Subject: Continuing Foot Pain    Hello,   I came in almost a year ago to get my foot looked at. I was experiencing some foot and knee pain. I'm still experiencing the foot and knee pain. I was wondering if you had any recommendations for a specialist that I could go see to take care of my foot?

## 2022-02-23 NOTE — TELEPHONE ENCOUNTER
From: Rohit Oseguera. To: Jayna Sabillon MD  Sent: 2/23/2022 8:25 AM CST  Subject: Foot Pain Part 2    Hello,   I came in almost a year ago to get my foot looked at. I was experiencing some foot and knee pain. I'm still experiencing the foot and knee pain. I was wondering if you had any recommendations for a specialist that I could go see to take care of my foot?

## 2022-03-01 ENCOUNTER — MED REC SCAN ONLY (OUTPATIENT)
Dept: ORTHOPEDICS CLINIC | Facility: CLINIC | Age: 32
End: 2022-03-01

## 2022-03-08 ENCOUNTER — OFFICE VISIT (OUTPATIENT)
Dept: ORTHOPEDICS CLINIC | Facility: CLINIC | Age: 32
End: 2022-03-08
Payer: COMMERCIAL

## 2022-03-08 DIAGNOSIS — M25.562 CHRONIC PAIN OF LEFT KNEE: ICD-10-CM

## 2022-03-08 DIAGNOSIS — M79.671 RIGHT FOOT PAIN: ICD-10-CM

## 2022-03-08 DIAGNOSIS — M77.50 CAPSULITIS OF METATARSOPHALANGEAL (MTP) JOINT: Primary | ICD-10-CM

## 2022-03-08 DIAGNOSIS — G89.29 CHRONIC PAIN OF LEFT KNEE: ICD-10-CM

## 2022-03-08 PROCEDURE — 99203 OFFICE O/P NEW LOW 30 MIN: CPT | Performed by: PODIATRIST

## 2022-03-16 ENCOUNTER — HOSPITAL ENCOUNTER (OUTPATIENT)
Dept: GENERAL RADIOLOGY | Age: 32
Discharge: HOME | End: 2022-03-16
Attending: PODIATRIST
Payer: COMMERCIAL

## 2022-03-16 DIAGNOSIS — M79.671 RIGHT FOOT PAIN: ICD-10-CM

## 2022-03-16 DIAGNOSIS — M77.50 CAPSULITIS OF METATARSOPHALANGEAL (MTP) JOINT: ICD-10-CM

## 2022-03-16 PROCEDURE — 73630 X-RAY EXAM OF FOOT: CPT | Performed by: PODIATRIST

## 2022-12-12 ENCOUNTER — OFFICE VISIT (OUTPATIENT)
Dept: FAMILY MEDICINE CLINIC | Facility: CLINIC | Age: 32
End: 2022-12-12
Payer: COMMERCIAL

## 2022-12-12 VITALS
WEIGHT: 276 LBS | HEART RATE: 84 BPM | RESPIRATION RATE: 16 BRPM | SYSTOLIC BLOOD PRESSURE: 124 MMHG | BODY MASS INDEX: 38.64 KG/M2 | OXYGEN SATURATION: 98 % | DIASTOLIC BLOOD PRESSURE: 76 MMHG | HEIGHT: 71 IN | TEMPERATURE: 98 F

## 2022-12-12 DIAGNOSIS — L98.9 BENIGN SKIN LESION OF THIGH: Primary | ICD-10-CM

## 2022-12-12 PROCEDURE — 99213 OFFICE O/P EST LOW 20 MIN: CPT | Performed by: FAMILY MEDICINE

## 2022-12-12 PROCEDURE — 3074F SYST BP LT 130 MM HG: CPT | Performed by: FAMILY MEDICINE

## 2022-12-12 PROCEDURE — 3008F BODY MASS INDEX DOCD: CPT | Performed by: FAMILY MEDICINE

## 2022-12-12 PROCEDURE — 3078F DIAST BP <80 MM HG: CPT | Performed by: FAMILY MEDICINE

## 2023-02-06 RX ORDER — VALACYCLOVIR HYDROCHLORIDE 1 G/1
2000 TABLET, FILM COATED ORAL EVERY 12 HOURS SCHEDULED
Qty: 20 TABLET | Refills: 1 | Status: SHIPPED | OUTPATIENT
Start: 2023-02-06

## 2023-10-23 RX ORDER — VALACYCLOVIR HYDROCHLORIDE 1 G/1
2000 TABLET, FILM COATED ORAL EVERY 12 HOURS SCHEDULED
Qty: 20 TABLET | Refills: 1 | Status: SHIPPED | OUTPATIENT
Start: 2023-10-23

## 2024-08-14 ENCOUNTER — OFFICE VISIT (OUTPATIENT)
Dept: FAMILY MEDICINE CLINIC | Facility: CLINIC | Age: 34
End: 2024-08-14
Payer: COMMERCIAL

## 2024-08-14 ENCOUNTER — LAB ENCOUNTER (OUTPATIENT)
Dept: LAB | Age: 34
End: 2024-08-14
Attending: FAMILY MEDICINE
Payer: COMMERCIAL

## 2024-08-14 VITALS
BODY MASS INDEX: 38.41 KG/M2 | HEART RATE: 76 BPM | RESPIRATION RATE: 18 BRPM | TEMPERATURE: 97 F | SYSTOLIC BLOOD PRESSURE: 132 MMHG | OXYGEN SATURATION: 97 % | DIASTOLIC BLOOD PRESSURE: 82 MMHG | WEIGHT: 274.38 LBS | HEIGHT: 71 IN

## 2024-08-14 DIAGNOSIS — Z23 NEED FOR VACCINATION: ICD-10-CM

## 2024-08-14 DIAGNOSIS — Z00.00 WELLNESS EXAMINATION: Primary | ICD-10-CM

## 2024-08-14 DIAGNOSIS — R79.89 LOW TESTOSTERONE IN MALE: ICD-10-CM

## 2024-08-14 DIAGNOSIS — J30.9 ALLERGIC RHINITIS, UNSPECIFIED SEASONALITY, UNSPECIFIED TRIGGER: ICD-10-CM

## 2024-08-14 DIAGNOSIS — Z00.00 LABORATORY EXAMINATION ORDERED AS PART OF A ROUTINE GENERAL MEDICAL EXAMINATION: ICD-10-CM

## 2024-08-14 LAB
ALBUMIN SERPL-MCNC: 4.7 G/DL (ref 3.2–4.8)
ALBUMIN/GLOB SERPL: 1.7 {RATIO} (ref 1–2)
ALP LIVER SERPL-CCNC: 90 U/L
ALT SERPL-CCNC: 28 U/L
ANION GAP SERPL CALC-SCNC: 3 MMOL/L (ref 0–18)
AST SERPL-CCNC: 31 U/L (ref ?–34)
BASOPHILS # BLD AUTO: 0.03 X10(3) UL (ref 0–0.2)
BASOPHILS NFR BLD AUTO: 0.6 %
BILIRUB SERPL-MCNC: 0.6 MG/DL (ref 0.3–1.2)
BUN BLD-MCNC: 14 MG/DL (ref 9–23)
CALCIUM BLD-MCNC: 10.1 MG/DL (ref 8.7–10.4)
CHLORIDE SERPL-SCNC: 104 MMOL/L (ref 98–112)
CHOLEST SERPL-MCNC: 145 MG/DL (ref ?–200)
CO2 SERPL-SCNC: 32 MMOL/L (ref 21–32)
CREAT BLD-MCNC: 1.15 MG/DL
EGFRCR SERPLBLD CKD-EPI 2021: 87 ML/MIN/1.73M2 (ref 60–?)
EOSINOPHIL # BLD AUTO: 0.17 X10(3) UL (ref 0–0.7)
EOSINOPHIL NFR BLD AUTO: 3.2 %
ERYTHROCYTE [DISTWIDTH] IN BLOOD BY AUTOMATED COUNT: 13.7 %
FASTING PATIENT LIPID ANSWER: YES
FASTING STATUS PATIENT QL REPORTED: YES
GLOBULIN PLAS-MCNC: 2.8 G/DL (ref 2–3.5)
GLUCOSE BLD-MCNC: 101 MG/DL (ref 70–99)
HCT VFR BLD AUTO: 45.4 %
HDLC SERPL-MCNC: 48 MG/DL (ref 40–59)
HGB BLD-MCNC: 14.5 G/DL
IMM GRANULOCYTES # BLD AUTO: 0 X10(3) UL (ref 0–1)
IMM GRANULOCYTES NFR BLD: 0 %
LDLC SERPL CALC-MCNC: 83 MG/DL (ref ?–100)
LYMPHOCYTES # BLD AUTO: 2.18 X10(3) UL (ref 1–4)
LYMPHOCYTES NFR BLD AUTO: 41.4 %
MCH RBC QN AUTO: 26.4 PG (ref 26–34)
MCHC RBC AUTO-ENTMCNC: 31.9 G/DL (ref 31–37)
MCV RBC AUTO: 82.5 FL
MONOCYTES # BLD AUTO: 0.43 X10(3) UL (ref 0.1–1)
MONOCYTES NFR BLD AUTO: 8.2 %
NEUTROPHILS # BLD AUTO: 2.46 X10 (3) UL (ref 1.5–7.7)
NEUTROPHILS # BLD AUTO: 2.46 X10(3) UL (ref 1.5–7.7)
NEUTROPHILS NFR BLD AUTO: 46.6 %
NONHDLC SERPL-MCNC: 97 MG/DL (ref ?–130)
OSMOLALITY SERPL CALC.SUM OF ELEC: 289 MOSM/KG (ref 275–295)
PLATELET # BLD AUTO: 287 10(3)UL (ref 150–450)
POTASSIUM SERPL-SCNC: 4.1 MMOL/L (ref 3.5–5.1)
PROT SERPL-MCNC: 7.5 G/DL (ref 5.7–8.2)
RBC # BLD AUTO: 5.5 X10(6)UL
SODIUM SERPL-SCNC: 139 MMOL/L (ref 136–145)
TRIGL SERPL-MCNC: 69 MG/DL (ref 30–149)
TSI SER-ACNC: 1.48 MIU/ML (ref 0.55–4.78)
VLDLC SERPL CALC-MCNC: 11 MG/DL (ref 0–30)
WBC # BLD AUTO: 5.3 X10(3) UL (ref 4–11)

## 2024-08-14 PROCEDURE — 99395 PREV VISIT EST AGE 18-39: CPT | Performed by: FAMILY MEDICINE

## 2024-08-14 PROCEDURE — 84403 ASSAY OF TOTAL TESTOSTERONE: CPT

## 2024-08-14 PROCEDURE — 84443 ASSAY THYROID STIM HORMONE: CPT

## 2024-08-14 PROCEDURE — 85025 COMPLETE CBC W/AUTO DIFF WBC: CPT

## 2024-08-14 PROCEDURE — 80053 COMPREHEN METABOLIC PANEL: CPT

## 2024-08-14 PROCEDURE — 36415 COLL VENOUS BLD VENIPUNCTURE: CPT

## 2024-08-14 PROCEDURE — 90715 TDAP VACCINE 7 YRS/> IM: CPT | Performed by: FAMILY MEDICINE

## 2024-08-14 PROCEDURE — 90471 IMMUNIZATION ADMIN: CPT | Performed by: FAMILY MEDICINE

## 2024-08-14 PROCEDURE — 84402 ASSAY OF FREE TESTOSTERONE: CPT

## 2024-08-14 PROCEDURE — 80061 LIPID PANEL: CPT

## 2024-08-14 NOTE — PROGRESS NOTES
CHIEF COMPLAINT:     Chief Complaint   Patient presents with    Physical     Patient here for yearly physical and lab work.    Shortness Of Breath     Patient states he has noticed while at work, patient is , that when climbing a flight of stairs wearing all his equipment he became short of breath. Patient denies any chest pain or dizziness. Patient states he works out frequently and this has never been a problem.       HPI:   Favian Cardenas Jr. is a 33 year old male who presents for a complete physical exam.     Patient has concerns of general health and wellness. Patient states that he has a family history of cardiac diseases and diabetes. Would like a full blood panel. Patient states that he has had two instances of very mild but noticeable bouts of shortness of breath while going up stairs. Patient denies any issues with fatigue or SOB with heavy exercise.    No visits with results within 3 Month(s) from this visit.   Latest known visit with results is:   [facility], [facility] Lab Encounter on 09/30/2020   Component Date Value Ref Range Status    ABO BLOOD TYPE 09/30/2020 B   Final    RH BLOOD TYPE 09/30/2020 Positive   Final    Free Testosterone, LC-MS/MS 09/30/2020 44.9 (L)  47.0 - 244.0 pg/mL Final    Total Testosterone 09/30/2020 273.8 (L)  300.0 - 1080.0 ng/dL Final    TSH 09/30/2020 0.984  0.358 - 3.740 mIU/mL Final    Cholesterol, Total 09/30/2020 134  <200 mg/dL Final    HDL Cholesterol 09/30/2020 52  40 - 59 mg/dL Final    Triglycerides 09/30/2020 74  30 - 149 mg/dL Final    LDL Cholesterol 09/30/2020 67  <100 mg/dL Final    VLDL 09/30/2020 15  0 - 30 mg/dL Final    Non HDL Chol 09/30/2020 82  <130 mg/dL Final    FASTING 09/30/2020 No   Final    Glucose 09/30/2020 77  70 - 99 mg/dL Final    Sodium 09/30/2020 138  136 - 145 mmol/L Final    Potassium 09/30/2020 3.5  3.5 - 5.1 mmol/L Final    Chloride 09/30/2020 105  98 - 112 mmol/L Final    CO2 09/30/2020 32.0  21.0 - 32.0 mmol/L Final    Anion Gap 09/30/2020  1  0 - 18 mmol/L Final    BUN 09/30/2020 12  7 - 18 mg/dL Final    Creatinine 09/30/2020 1.37 (H)  0.70 - 1.30 mg/dL Final    BUN/CREA Ratio 09/30/2020 8.8 (L)  10.0 - 20.0 Final    Calcium, Total 09/30/2020 8.8  8.5 - 10.1 mg/dL Final    Calculated Osmolality 09/30/2020 285  275 - 295 mOsm/kg Final    GFR, Non- 09/30/2020 70  >=60 Final    GFR, -American 09/30/2020 81  >=60 Final    AST 09/30/2020 34  15 - 37 U/L Final    ALT 09/30/2020 40  16 - 61 U/L Final    Alkaline Phosphatase 09/30/2020 78  45 - 117 U/L Final    Bilirubin, Total 09/30/2020 0.5  0.1 - 2.0 mg/dL Final    Total Protein 09/30/2020 7.3  6.4 - 8.2 g/dL Final    Albumin 09/30/2020 3.9  3.4 - 5.0 g/dL Final    Globulin  09/30/2020 3.4  2.8 - 4.4 g/dL Final    A/G Ratio 09/30/2020 1.1  1.0 - 2.0 Final    FASTING 09/30/2020 No   Final    WBC 09/30/2020 3.6 (L)  4.0 - 11.0 x10(3) uL Final    RBC 09/30/2020 5.51  4.30 - 5.70 x10(6)uL Final    HGB 09/30/2020 14.6  13.0 - 17.5 g/dL Final    HCT 09/30/2020 46.5  39.0 - 53.0 % Final    PLT 09/30/2020 240.0  150.0 - 450.0 10(3)uL Final    MCV 09/30/2020 84.4  80.0 - 100.0 fL Final    MCH 09/30/2020 26.5  26.0 - 34.0 pg Final    MCHC 09/30/2020 31.4  31.0 - 37.0 g/dL Final    RDW 09/30/2020 12.8  11.0 - 15.0 % Final    RDW-SD 09/30/2020 39.2  35.1 - 46.3 fL Final    Neutrophil Absolute Prelim 09/30/2020 1.13 (L)  1.50 - 7.70 x10 (3) uL Final    Neutrophil Absolute 09/30/2020 1.13 (L)  1.50 - 7.70 x10(3) uL Final    Lymphocyte Absolute 09/30/2020 1.72  1.00 - 4.00 x10(3) uL Final    Monocyte Absolute 09/30/2020 0.63  0.10 - 1.00 x10(3) uL Final    Eosinophil Absolute 09/30/2020 0.11  0.00 - 0.70 x10(3) uL Final    Basophil Absolute 09/30/2020 0.01  0.00 - 0.20 x10(3) uL Final    Immature Granulocyte Absolute 09/30/2020 0.00  0.00 - 1.00 x10(3) uL Final    Neutrophil % 09/30/2020 31.3  % Final    Lymphocyte % 09/30/2020 47.8  % Final    Monocyte % 09/30/2020 17.5  % Final    Eosinophil %  09/30/2020 3.1  % Final    Basophil % 09/30/2020 0.3  % Final    Immature Granulocyte % 09/30/2020 0.0  % Final        Imms- UTD    Immunization History   Administered Date(s) Administered    Covid-19 Vaccine Pfizer 30 mcg/0.3 ml 02/22/2021, 03/15/2021    FLULAVAL 6 months & older 0.5 ml Prefilled syringe ([id number]) 09/26/2019, 09/29/2020    TDAP 07/30/2013, 08/14/2024    Tb Intradermal Test 09/30/2021        Prostate cancer screening- N/A    Colon cancer screening- N/A    Dental/Eye Check up-  Recommended pt see dentist once every 6 months for a cleaning and once every year for an eye exam.       Current Outpatient Medications   Medication Sig Dispense Refill    valACYclovir 1 G Oral Tab Take 2 tablets (2,000 mg total) by mouth every 12 (twelve) hours. As soon as symptoms start for 1 day 20 tablet 1    Multiple Vitamin (MULTI-VITAMIN DAILY) Oral Tab Take 1 tablet by mouth daily.      ascorbic acid, VITAMIN C, 250 MG Oral Tab Take 1 tablet (250 mg total) by mouth daily.      Cholecalciferol (VITAMIN D3) 25 MCG (1000 UT) Oral Cap Take 1 tablet by mouth daily.      Zinc 100 MG Oral Tab Take 1 tablet by mouth daily.        Past Medical History:    Goiter    Undiagnosed cardiac murmurs      History reviewed. No pertinent surgical history.   Family History   Problem Relation Age of Onset    Anemia Mother     Asthma Sister     Hypertension Maternal Grandmother     Hypertension Maternal Grandfather     Hypertension Father     Other (gastric ulcer [Other]) Mother     Diabetes Maternal Grandfather         type 2      Social History:  Social History     Socioeconomic History    Marital status: Single   Tobacco Use    Smoking status: Former    Smokeless tobacco: Never   Vaping Use    Vaping status: Never Used   Substance and Sexual Activity    Alcohol use: No    Drug use: No    Sexual activity: Yes   Other Topics Concern    Caffeine Concern Yes    Exercise Yes      Exercise:  3-5 times per week, running, walking, jujitsu and muy  menaa .  Diet: watches calories closely     REVIEW OF SYSTEMS:   GENERAL: Feels well otherwise  SKIN: denies any unusual skin lesions  EYES:denies blurred vision or double vision  HEENT: denies nasal congestion, sinus pain or ST  LUNGS: denies shortness of breath at rest, 2 episodes of shortness of breath going up stairs, hasn't happened again, mildly concerned.  CARDIOVASCULAR: denies chest pain or palpitations   GI: denies abdominal pain or heartburn.  No N/V/C/D.  : denies nocturia or changes in stream  MUSCULOSKELETAL: denies back pain or other joint pain  NEURO: denies headaches  PSYCHE: denies depression or anxiety  HEMATOLOGIC: denies hx of anemia or other bleeding disorders  ENDOCRINE: denies thyroid history  ALLERGY/ASTHMA: denies hx of seasonal allergies or asthma    EXAM:   /82 (BP Location: Left arm, Patient Position: Sitting, Cuff Size: adult)   Pulse 76   Temp 97.1 °F (36.2 °C) (Temporal)   Resp 18   Ht 5' 11\" (1.803 m)   Wt 274 lb 6.4 oz (124.5 kg)   SpO2 97%   BMI 38.27 kg/m²   Body mass index is 38.27 kg/m².     GENERAL: well developed, well nourished,in no apparent distress  SKIN: no rashes,no suspicious lesions  HEENT: atraumatic, normocephalic,ears bulging TM bilaterally cloudy effusion, and throat is red and irritated from PND.  EYES:PERRLA, EOMI, normal optic disk,conjunctiva are clear  NECK: supple,no adenopathy,no bruits  CHEST: no chest tenderness  LUNGS: Clear to auscultation bilaterally. No diminished breath sounds. No wheezing, rhonchi, or rales.  CARDIO: RRR without murmur  GI: BS's present x4., No tenderness of palpation.  No obvious masses or palpable organomegaly   MUSCULOSKELETAL: back is not tender, ROM of the back fully intact  EXTREMITIES: no cyanosis, clubbing or edema  NEURO: Alert & Oriented x3. Cranial nerves are grossly intact.     ASSESSMENT AND PLAN:   Favian Cardenas  is a 33 year old male who presents for an annual physical exam.     ASSESSMENT &  PLAN:    1. Wellness examination  Discussion about general health and wellness screening.   Patient is agreeable to getting lab work done to evaluate and monitor as part of screening and prevention of health issues.  Discussion about general diet and increasing activity.  Discussion about taking daily multivitamin.   Will discuss any abnormal values if they arise.    2. Laboratory examination ordered as part of a routine general medical examination  - CBC With Differential With Platelet; Future  - Comp Metabolic Panel (14); Future  - Lipid Panel; Future  - TSH W Reflex To Free T4; Future    3. Need for vaccination  - TdaP (Adacel, Boostrix) [25174]    4. Allergic rhinitis, unspecified seasonality, unspecified trigger   Start daily antihistamine: Claritin, Zyrtec, Allegra are all good options. Generic is fine.  Use nasal spray daily  Avoid allergy triggers  Follow up if symptoms worsen or new onset of fever    Discussed heart scan with patient, can call and order without any orders.     Patient's questions/concerns were addressed and answered. Patient is in agreement with treatment plan.     Follow Up:    Follow up as needed or for yearly visits for medication refills, lab work, and other yearly preventative care.

## 2024-08-16 DIAGNOSIS — R79.89 LOW TESTOSTERONE: Primary | ICD-10-CM

## 2024-08-16 LAB
FREE TESTOST DIRECT: 7.9 PG/ML
TESTOSTERONE: 350 NG/DL

## 2024-08-19 ENCOUNTER — PATIENT MESSAGE (OUTPATIENT)
Dept: FAMILY MEDICINE CLINIC | Facility: CLINIC | Age: 34
End: 2024-08-19

## 2024-08-23 NOTE — TELEPHONE ENCOUNTER
From: Favian Cardenas Jr.  To: Kareem Nick  Sent: 8/19/2024 12:36 PM CDT  Subject: Detailed Explanation of Results     Trinity Serna (Not sure if I call you Dr, Nurse, Sr or what)    Is there anyway I can get a detailed explanation for my blood work? I know you gave me the summary but I'd like to know what everything means. Can you or someone from the office help me out with that? I started to to do it on my own but I thought I should consult the pros.

## 2024-09-11 ENCOUNTER — OFFICE VISIT (OUTPATIENT)
Facility: LOCATION | Age: 34
End: 2024-09-11
Payer: COMMERCIAL

## 2024-09-11 DIAGNOSIS — E29.1 HYPOGONADISM IN MALE: Primary | ICD-10-CM

## 2024-09-11 PROCEDURE — 99203 OFFICE O/P NEW LOW 30 MIN: CPT

## 2024-09-11 NOTE — PROGRESS NOTES
[facility] MEDICAL [facility], [address] [place] [address]    Urology Consult Note    History of Present Illness:   Patient is a(n) 33 year old male who presents for low T.     Pt had T checked on 9/30/20 1414 at 273.8. Repeat T checked on 8/14/24 908 at 350, free T 7.9.    Both times checked as routine work up. No particular sx complaints.     Has twins at home; 18 months. Conceived naturally. Works in law enforcement, afternoon/evening hours.     C/o fatigue and irritability but unsure if more related to kids and work schedule etc.     Snores mildly.     HISTORY:  Past Medical History:    Goiter    Undiagnosed cardiac murmurs      No past surgical history on file.   Family History   Problem Relation Age of Onset    Anemia Mother     Asthma Sister     Hypertension Maternal Grandmother     Hypertension Maternal Grandfather     Hypertension Father     Other (gastric ulcer [Other]) Mother     Diabetes Maternal Grandfather         type 2      Social History:   Social History     Socioeconomic History    Marital status:    Tobacco Use    Smoking status: Former    Smokeless tobacco: Never   Vaping Use    Vaping status: Never Used   Substance and Sexual Activity    Alcohol use: No    Drug use: No    Sexual activity: Yes   Other Topics Concern    Caffeine Concern Yes    Exercise Yes        Allergies  No Known Allergies    Review of Systems:   A 10-point review of systems was completed and is negative other than as noted above.    Physical Exam:   There were no vitals taken for this visit.    GENERAL APPEARANCE: no acute distress  NEUROLOGIC: converses appropriately  HEAD: atraumatic, normocephalic  LUNGS: non-labored breathing  ABDOMEN: soft, nontender, non-distended  BACK: no CVA tenderness  PSYCH: appropriate affect and mood    Results:     Laboratory Data:  Lab Results   Component Value Date    WBC 5.3 08/14/2024    HGB 14.5 08/14/2024    .0 08/14/2024     Lab Results   Component Value Date      08/14/2024    K 4.1 08/14/2024     08/14/2024    CO2 32.0 08/14/2024    BUN 14 08/14/2024     (H) 08/14/2024    GFRAA 81 09/30/2020    AST 31 08/14/2024    ALT 28 08/14/2024    TP 7.5 08/14/2024    ALB 4.7 08/14/2024    CA 10.1 08/14/2024       Urinalysis Results (last 3 years):  No results for input(s)  in the last 3 years    Urine Culture Results (last 3 years):  No results found for: \"URINECUL\"    Imaging  No results found.      Impression:   Recommendations:  Hypogonadism  Low T  - I spoke with him in detail regarding androgen replacement therapy with supplemental testosterone. Discussed the various methods of delivery including AndroGel, Testopel, testosterone injections, clomid and the risk of polycythemia and need for monitoring of blood work.    - I discussed that at this time there is not definitive evidence whether testosterone increases or decreases the risk of cardiovascular events. Research shows there is no increased risk of prostate cancer with ART.    - Lastly, I informed him that testosterone replacement therapy can cause infertility all on the medication if he is attempting to have children.  - we will observe sx for now; current labs not convincing for true low T that requires tx. Initial labs not before 10am.   - unsure if wanting more kids in future     A total of 30 minutes were spent on the visit including chart review in preparation for the visit, time with the patient, placing orders and communication with other providers where appropriate.    Thank you very much for this consult. Please call if there are any questions or concerns.     Rosalba Grimm PA-C  Urology  [facility]  Phone: [phone]    Date: 9/11/2024  Time: 10:08 AM

## 2024-09-25 ENCOUNTER — HOSPITAL ENCOUNTER (OUTPATIENT)
Dept: CT IMAGING | Age: 34
Discharge: HOME | End: 2024-09-25
Attending: FAMILY MEDICINE

## 2024-09-25 VITALS — BODY MASS INDEX: 38.36 KG/M2 | WEIGHT: 274 LBS | HEIGHT: 70.98 IN

## 2024-09-25 DIAGNOSIS — Z13.6 SCREENING FOR CARDIOVASCULAR CONDITION: ICD-10-CM

## 2024-09-25 NOTE — PROGRESS NOTES
Date of Service 9/25/2024    REI ARCOS  Date of Birth 11/25/1990    Patient Age: 33 year old    PCP: Acosta Mallory MD  [address]   [address]  [facility] [address]    Heart Scan Consult  Preliminary Heart Scan Score: 0    Previous Screening  Heart Scan Completed Previously: No        Peripheral Vascular Scan Completed Previously: No          Risk Factors  Personal Risk Factors  Non-alterable Risk Factors: Gender (No family history of high cholesterol)  Alterable Risk Factors: Stress;Obesity      Body Mass Index  Body mass index is 38.23 kg/m².    Blood Pressure  Blood Pressure measurement declined during this encounter.  (Normal =< 120/80,  Elevated = 120-129/ >80,  High Stage1 130-139/80-89 , Stage2 >140/>90)    Lipid Profile  Cholesterol: 145, done on 8/14/2024.  HDL Cholesterol: 48, done on 8/14/2024.  LDL Cholesterol: 83, done on 8/14/2024.  TriGlycerides 69, done on 8/14/2024.    Cholesterol Goals  Value   Total  =< 200   HDL  = > 45 Men = > 55 Women   LDL   =< 100   Triglycerides  =< 150       Glucose and Hemoglobin A1C  Lab Results   Component Value Date     (H) 08/14/2024     (Normal Fasting Glucose < 100mg/dl )    Nurse Review  Risk factor information and results reviewed with Nurse: Yes    Recommended Follow Up:  Consult your physician regarding:: Final Heart Scan Report;Discuss potential for Incidental Finding;Discuss Potential for Score Variance      Recommendations for Change:  Nutrition Changes: Increase Fiber;Low Fat Dairy;Low Salt Eating (Eats mostly chicken, turkey, fish with vegetables.)    Cholesterol Modification (goal of therapy depends upon your risk): No Change Needed    Exercise: No Change Needed    Smoking Cessation: No Change Needed    Weight Management: Decrease Current Weight    Stress Management: Adopt Stress Management Techniques    Repeat Heart Scan: Discuss with your Physician;5 years if Calcium Score is 0.0              [facility] Recommended  Resources:  Recommended Resources: Upcoming Classes, Medical Services and Health Library [email or web address];[facility] [phone];[facility] [phone] ([facility])            Shirley ROBLES RN        Please Contact the Nurse Heart Line with any Questions or Concerns [phone].

## (undated) DIAGNOSIS — M79.671 RIGHT FOOT PAIN: Primary | ICD-10-CM

## (undated) DIAGNOSIS — M77.50 CAPSULITIS OF METATARSOPHALANGEAL (MTP) JOINT: ICD-10-CM

## (undated) NOTE — ED AVS SNAPSHOT
[facility] Emergency Department    [facility]  [address] [address]    Phone:  [phone]    Fax:  [phone]           Luana White   MRN: [id number]    Department:  [facility] Emergency Department   Date of Visit:  4/11/2 Pediatric [id number] Emergency Department   [phone]       To Check ER Wait Times:  TEXT 'ERwait' to 09715      Click [email or web address]      Or call [phone]    If you have any problems with your follow-up, please call our case [facility] before you leave. After you leave, you should follow the attached instructions. I have read and understand the instructions given to me by my caregivers. 24-Hour Pharmacies        Pharmacy Address Phone Number   [phone]. view more details from this visit by going to [email or web address]. If you've recently had a stay at the Hospital you can access your discharge instructions in [facility] by going to Visits < Admission Summaries.  If you've been to the Emergency Depar

## (undated) NOTE — ED AVS SNAPSHOT
[facility] Emergency Department    [place]  [address] [address]    Phone:  [phone]    Fax:  [phone]           Jj Arcos   MRN: [id number]    Department:  [facility] Emergency Department   Date of Visit:  4/11/2 IF THERE IS ANY CHANGE OR WORSENING OF YOUR CONDITION, CALL YOUR PRIMARY CARE PHYSICIAN AT ONCE OR RETURN IMMEDIATELY TO THE EMERGENCY DEPARTMENT.     If you have been prescribed any medication(s), please fill your prescription right away and begin taking t

## (undated) NOTE — LETTER
10/29/19        Favian [address]      Dear Lukas Conway,    [facility] records indicate that you have outstanding lab work and or testing that was ordered for you and has not yet been completed:  Orders Placed This Encounter      CBC

## (undated) NOTE — LETTER
April 19, 2017    Patient: March Chevy   Date of Visit: 4/11/2017       To Whom It May Concern:    Reva Mac was seen and treated in our emergency department on 4/11/2017. He had a rapid strep test done and has a strep culture pending.     If you ha